# Patient Record
Sex: MALE | Race: BLACK OR AFRICAN AMERICAN | NOT HISPANIC OR LATINO | URBAN - NONMETROPOLITAN AREA
[De-identification: names, ages, dates, MRNs, and addresses within clinical notes are randomized per-mention and may not be internally consistent; named-entity substitution may affect disease eponyms.]

---

## 2018-11-14 PROBLEM — H43.813: Noted: 2019-01-25

## 2018-11-14 PROBLEM — Z96.1: Noted: 2018-11-14

## 2019-01-25 ENCOUNTER — IMPORTED ENCOUNTER (OUTPATIENT)
Dept: URBAN - NONMETROPOLITAN AREA CLINIC 1 | Facility: CLINIC | Age: 49
End: 2019-01-25

## 2019-01-25 PROCEDURE — 92014 COMPRE OPH EXAM EST PT 1/>: CPT

## 2019-01-25 NOTE — PATIENT DISCUSSION
PVD ou-Retina flat 360 with no breaks tears or heme.-S&S of RD/RT reviewed with pt.-Stressed that pt should contact our office right away with any changes or increase in symptoms.

## 2019-05-22 ENCOUNTER — IMPORTED ENCOUNTER (OUTPATIENT)
Dept: URBAN - NONMETROPOLITAN AREA CLINIC 1 | Facility: CLINIC | Age: 49
End: 2019-05-22

## 2019-05-22 PROBLEM — Z96.1: Noted: 2018-11-14

## 2019-05-22 PROBLEM — H52.4: Noted: 2019-11-08

## 2019-05-22 PROBLEM — H26.493: Noted: 2019-05-22

## 2019-05-22 PROCEDURE — 99213 OFFICE O/P EST LOW 20 MIN: CPT

## 2019-05-22 NOTE — PATIENT DISCUSSION
Early PCO OU -Explained symptoms of advancing PCO. -Continue to monitor for now. Pt will notify us if any new symptoms develop. Possible PVD OD-  Hx in chart but not seen on direct exam - Continue to monitor - Recommend to increase ATs to 8-10 times per dayRTC 6 month dilation: PCO Check

## 2019-11-08 ENCOUNTER — IMPORTED ENCOUNTER (OUTPATIENT)
Dept: URBAN - NONMETROPOLITAN AREA CLINIC 1 | Facility: CLINIC | Age: 49
End: 2019-11-08

## 2019-11-08 PROBLEM — H52.4: Noted: 2019-11-08

## 2019-11-08 PROBLEM — Z96.1: Noted: 2018-11-14

## 2019-11-08 PROBLEM — H26.493: Noted: 2019-05-22

## 2019-11-08 PROCEDURE — 92015 DETERMINE REFRACTIVE STATE: CPT

## 2019-11-08 PROCEDURE — 92340 FIT SPECTACLES MONOFOCAL: CPT

## 2019-11-08 PROCEDURE — 92014 COMPRE OPH EXAM EST PT 1/>: CPT

## 2019-11-08 NOTE — PATIENT DISCUSSION
Early PCO OU -Explained symptoms of advancing PCO. -Continue to monitor for now. Pt will notify us if any new symptoms develop. Possible PVD OD-  Hx in chart but not seen on direct exam - Continue to monitor - Recommend to increase ATs to 8-10 times per dayPresbyopia-Discussed diagnosis with patient. Updated spec Rx given. Recommend lens that will provide comfort as well as protect safety and health of eyes.

## 2020-11-10 ENCOUNTER — IMPORTED ENCOUNTER (OUTPATIENT)
Dept: URBAN - NONMETROPOLITAN AREA CLINIC 1 | Facility: CLINIC | Age: 50
End: 2020-11-10

## 2020-11-10 PROCEDURE — 92014 COMPRE OPH EXAM EST PT 1/>: CPT

## 2020-11-10 NOTE — PATIENT DISCUSSION
Early PCO OU -Explained symptoms of advancing PCO. -Continue to monitor for now. Pt will notify us if any new symptoms develop. s/p PCIOL-Stable PCIOL OU.-Monitor for PCO. -RTC . Presbyopia-Discussed diagnosis with patient.

## 2021-12-02 ENCOUNTER — IMPORTED ENCOUNTER (OUTPATIENT)
Dept: URBAN - NONMETROPOLITAN AREA CLINIC 1 | Facility: CLINIC | Age: 51
End: 2021-12-02

## 2021-12-02 PROCEDURE — 92014 COMPRE OPH EXAM EST PT 1/>: CPT

## 2022-04-10 ASSESSMENT — VISUAL ACUITY
OD_CC: 20/40
OD_SC: 20/20
OD_SC: 20/20-1
OS_SC: 20/20-2
OS_CC: J2
OD_CC: J2
OS_CC: J5
OD_CC: 20/30-1
OS_SC: 20/20
OU_CC: 20/20-
OD_SC: 20/20-2
OS_SC: 20/20-1
OS_CC: 20/30
OS_GLARE: 20/40-1
OD_GLARE: 20/20-1
OS_CC: 20/25-1
OS_GLARE: 20/20-1
OD_GLARE: 20/40
OD_CC: J5

## 2022-04-10 ASSESSMENT — TONOMETRY
OS_IOP_MMHG: 18
OD_IOP_MMHG: 18
OS_IOP_MMHG: 19
OS_IOP_MMHG: 18
OD_IOP_MMHG: 18
OD_IOP_MMHG: 18

## 2023-08-04 ENCOUNTER — ESTABLISHED PATIENT (OUTPATIENT)
Dept: RURAL CLINIC 1 | Facility: CLINIC | Age: 53
End: 2023-08-04

## 2023-08-04 DIAGNOSIS — H43.813: ICD-10-CM

## 2023-08-04 DIAGNOSIS — Z96.1: ICD-10-CM

## 2023-08-04 DIAGNOSIS — H26.493: ICD-10-CM

## 2023-08-04 PROCEDURE — 92014 COMPRE OPH EXAM EST PT 1/>: CPT

## 2023-08-04 ASSESSMENT — VISUAL ACUITY
OU_CC: 20/20
OD_CC: 20/20
OS_CC: 20/20
OD_CC: 20/20
OU_CC: 20/20
OS_CC: 20/20

## 2023-08-04 ASSESSMENT — TONOMETRY
OS_IOP_MMHG: 17
OD_IOP_MMHG: 17

## 2024-08-22 ENCOUNTER — COMPREHENSIVE EXAM (OUTPATIENT)
Dept: RURAL CLINIC 1 | Facility: CLINIC | Age: 54
End: 2024-08-22

## 2024-08-22 DIAGNOSIS — H43.813: ICD-10-CM

## 2024-08-22 DIAGNOSIS — H26.493: ICD-10-CM

## 2024-08-22 DIAGNOSIS — Z96.1: ICD-10-CM

## 2024-08-22 PROCEDURE — 92014 COMPRE OPH EXAM EST PT 1/>: CPT

## 2024-08-22 ASSESSMENT — VISUAL ACUITY
OS_CC: 20/20
OD_CC: 20/20
OS_CC: 20/20
OD_CC: 20/25+1
OU_CC: 20/20
OU_CC: 20/20

## 2024-08-22 ASSESSMENT — TONOMETRY
OD_IOP_MMHG: 17
OS_IOP_MMHG: 17